# Patient Record
(demographics unavailable — no encounter records)

---

## 2025-04-25 NOTE — ASSESSMENT
[FreeTextEntry1] : 62 yo woman, former smoker (<<10 pk yrs) with bronchiectasis and NTM LD (dx ), s/p 1 yr of tripple-abx therapy in , who was previously followed by Dr. Plaza - presents for routine follow up  # Bronchiectasis >3 lobes Stable to mildly improved radiographic appearance as of 2024 sx: cough with mucus (< 1tbs per day), hemoptysis once (Summer 2024), no dyspnea, no fatigue BMI: 25 Etiology of bronchiectasis is unclear. May be secondary to childhood infections (including ). No h/o chronic rhinosinusitis, pancreatitis, GERD, CTD or IBD. Posible Asthma.  - continue with airway clearance maneuvers - refer to CF team for further evaluation of CFTR, PCD and related disorders - will refill levaquin for emergency use  # History of LINO infection.  -Another sputum culture obtained today  # Asthma -c/w Dulera   Follow-up 6 months: PFTs before next visit, may consider repeat CT scan at next visit

## 2025-04-25 NOTE — FAMILY HISTORY
[TextEntry] : no family h/o CF, bronchiectasis or any other lung disease Pt's uncle had TB but  3 yrs before pt was born

## 2025-04-25 NOTE — PHYSICAL EXAM
[No Acute Distress] : no acute distress [Normal Oropharynx] : normal oropharynx [Normal Rate/Rhythm] : normal rate/rhythm [Normal S1, S2] : normal s1, s2 [No Resp Distress] : no resp distress [TextBox_68] : gil YANES

## 2025-04-25 NOTE — SOCIAL HISTORY
[TextEntry] : From [facility]. In the [place]. 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

## 2025-04-25 NOTE — HISTORY OF PRESENT ILLNESS
[TextBox_4] : 60 yo woman, former smoker (<<10 pk yrs) with bronchiectasis and NTM LD (both dx 2014) who was previously followed by Dr. Plaza (who left the institution) who presents  for follow up  Had PNA soon after birth Frequent respiratory infections as a child PPD+ / has h/o BCG When she grew up, she "outgrew" this Immigrated from [place] in 1991 and did not have any serious resp infection until... In 2014 pt developed high fever and was dx with bilateral PNA Dx with BE Persistent mucus Followed with serial scans, then had bronch and bx Treated with triple abx for LINO in 2015 (12 months). Phlegm remained unchanged. No weight loss. No night sweats. No hemoptysis  Single episode of hemoptysis 2 yrs ago. Mild.  In Dec 2023 had a bad URI/bronchitis. In Jan 2024 had COVID. Had abx course  Sx at this time: Phlegm, not every day, usually clear to slight yellow using Aerobika Symbicort BID (seems to have helped by reducing cough when weather is bad) No hypertonic saline No Vest  no sinus dz hx or current sx no GERD no GI problems  4/19/24 Airway clearance: Acapella in AM 7% saline neb daily: a little sputum production most mucus in the early afternoon symbicort twice a day no fever walk upstairs 60 steps daily with minimal dyspnea no night sweats stable weight no hemoptysis tries to jog   10/4/24 Feeling well < 1 tbsp phlegm 1 episode of minimal hemoptysis since last visit Symbicort tiwce a day exercises 7% saline twice a day  4/25/25 Feeling better has been using huffing, jumping and percussion to increase sputum production no nebulizer or aerobika use walking for 30 minutes approx 1 tbsp of phlegm weight gain no hemopytsis recently

## 2025-04-25 NOTE — RESULTS/DATA
[TextEntry] : RADIOLOGY  CT CHEST   ORDERED BY: KAITLYNN GOLDSTEIN PROCEDURE DATE:  04/12/2024 IMPRESSION:  1. Continued evidence of moderate to severe small and large airways inflammation demonstrating a waxing and waning pattern and overall slightly improved from examination dated 11/3/2021 2. Additional findings as described above.  PFT 4/19/2024 FVC: 2.06 L, 61% predicted FEV1: 1.38 L, 53% predicted FEV1/FVC 67% CHANELLE: 10 ppb Impression moderate obstructive ventilatory deficit, possible concomitant restriction.  Not significantly different from 12/20/2022.  Normal exhaled nitric oxide  12/20/2022 Prebronchodilator: FVC 2.23 L or 65% predicted FEV1 1.47 L of 55% predicted FEV1/FVC 68% Postbronchodilator  FVC 2.39 L or 69% predicted (+7%) FEV1 1.65 L or 62% predicted (+12%) FEV1/FVC 69% Impression: Moderate obstructive ventilatory deficit with borderline bronchodilator responsiveness   EKG / ECHO      MICRO 3/13/2024 15:35 No AFB on concentrated smear, culture negative at 4 weeks Few Pseudomonas aeruginosa No fungus at 4 weeks  PATH    OTHER LABS OF NOTE 3/14/2024: Normal CBC with differential (1% eos), normal/negative alpha-1 antitrypsin, rheumatoid factor, C-reactive protein undetectable, vitamin D 27, normal IgG, IgA and IgM; normal immunoglobulin subtypes except mildly elevated IgG4 at 149 mg/dL (upper limit of normal 123), total IgE 101;

## 2025-04-25 NOTE — SOCIAL HISTORY
[TextEntry] : From [facility]. [place] the [place]. 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

## 2025-04-25 NOTE — IMMUNIZATIONS
[TextEntry] : Vaccination history: Last COVID Last flu: Fall 2023 RSV: none Last pneumococcal / type: series of 2 in 2016-17

## 2025-06-08 NOTE — HISTORY OF PRESENT ILLNESS
[Former] : former [Never] : never [TextBox_4] : Lucy is a 62-year-old female with a medical history of bronchiectasis of unknown etiology and NTM (mycobacterium avium-intracellulare) who presents to the CF clinic for a cystic fibrosis evaluation following a referral from MD Lancaster.   She was born in [facility] and denies being aware of any health complications at birth. There is no known family history of cystic fibrosis or pulmonary disease. She recalls being sick often particularly with multiple cases of pneumonia in early childhood, however, states from her teenage years until approximately 10 years ago was reportedly healthy. There is a distant history about 20 years ago of cigarette use during which time she would smoke one to two cigarettes in a day, but daily use was denied. Additionally, she is a mother of one child who was reportedly conceived without any difficulty, nor did she seek to have more children. 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  As previously mentioned, her pulmonary symptoms began approximately 10 years ago during which time she was diagnosed with an LINO infection. At that time, she experienced three days of fever and shortness of breath. She sought medical attention with her primary care provider who subsequently referred her to the ER where she was diagnosed with pneumonia and was admitted for one week. Upon discharge she was feeling better but not long after developed a productive cough with mucus that appeared "odd" in color. She identified with the LINO infection and recalls having a biopsy. During that time, she was referred to MD Maria Del Carmen Hu who treated with a triple therapy for LINO.  At this present time, she reports being without acute illness but states she is frequently ill. She coughs daily and denies noting a particular time during the day when the cough is worse. Her cough is associated with the daily presence of chest congestion which results in the need to clear; however, the secretion is reportedly difficult to mobilize. The secretion tends to vary in consistency but appears light yellow in color. The presence of wheezing, chest tightness, SOB and hemoptysis was denied. It was stated last summer there was an episode of hemoptysis which she suspects was a result of a coughing fit because she does not recall having been sick. To help mobilize secretions she is implementing a young cough technique which she finds to be most effective and exercise. She admits to having an Aerobika PEP device which is no longer in use since it was not found to be beneficial.   Furthermore, it was mentioned she has contracted COVID twice. Once on June 22, 2022, and then in January 2024 during which time she developed what she described as flu-like symptoms which included runny nose, fatigue, and malaise but no increase in pulmonary symptoms. Since having contracted COVID she has been experiencing chronic fatigue which is noted particularly with climbing multiple flights of stairs on her way to work which was previously done without issues. She reiterated that shortness of breath is not experienced as a result of exertion when climbing the stairs, yet she does experience difficulty once retirement through the approximate 60 stairs.   With regard to the gastrointestinal tract, she denies having any complaints. Good oral intake was reported. She denies experiencing difficulty gaining weight from childhood until present. The presence of abdominal pain, nausea, diarrhea, constipation, gassiness, bloating, steatorrhea and foul-smelling stools were denied. Bowel movements occur twice daily and are formed.

## 2025-06-08 NOTE — HISTORY OF PRESENT ILLNESS
[Former] : former [Never] : never [TextBox_4] : Lucy is a 62-year-old female with a medical history of bronchiectasis of unknown etiology and NTM (mycobacterium avium-intracellulare) who presents to the CF clinic for a cystic fibrosis evaluation following a referral from MD Lancaster.   She was born in [facility] and denies being aware of any health complications at birth. There is no known family history of cystic fibrosis or pulmonary disease. She recalls being sick often particularly with multiple cases of pneumonia in early childhood, however, states from her teenage years until approximately 10 years ago was reportedly healthy. There is a distant history about 20 years ago of cigarette use during which time she would smoke one to two cigarettes in a day, but daily use was denied. Additionally, she is a mother of one child who was reportedly conceived without any difficulty, nor did she seek to have more children. 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  As previously mentioned, her pulmonary symptoms began approximately 10 years ago during which time she was diagnosed with an LINO infection. At that time, she experienced three days of fever and shortness of breath. She sought medical attention with her primary care provider who subsequently referred her to the ER where she was diagnosed with pneumonia and was admitted for one week. Upon discharge she was feeling better but not long after developed a productive cough with mucus that appeared "odd" in color. She identified with the LINO infection and recalls having a biopsy. During that time, she was referred to MD Maria Del Carmen Hu who treated with a triple therapy for LINO.  At this present time, she reports being without acute illness but states she is frequently ill. She coughs daily and denies noting a particular time during the day when the cough is worse. Her cough is associated with the daily presence of chest congestion which results in the need to clear; however, the secretion is reportedly difficult to mobilize. The secretion tends to vary in consistency but appears light yellow in color. The presence of wheezing, chest tightness, SOB and hemoptysis was denied. It was stated last summer there was an episode of hemoptysis which she suspects was a result of a coughing fit because she does not recall having been sick. To help mobilize secretions she is implementing a young cough technique which she finds to be most effective and exercise. She admits to having an Aerobika PEP device which is no longer in use since it was not found to be beneficial.   Furthermore, it was mentioned she has contracted COVID twice. Once on June 22, 2022, and then in January 2024 during which time she developed what she described as flu-like symptoms which included runny nose, fatigue, and malaise but no increase in pulmonary symptoms. Since having contracted COVID she has been experiencing chronic fatigue which is noted particularly with climbing multiple flights of stairs on her way to work which was previously done without issues. She reiterated that shortness of breath is not experienced as a result of exertion when climbing the stairs, yet she does experience difficulty once [facility] through the approximate 60 stairs.   With regard to the gastrointestinal tract, she denies having any complaints. Good oral intake was reported. She denies experiencing difficulty gaining weight from childhood until present. The presence of abdominal pain, nausea, diarrhea, constipation, gassiness, bloating, steatorrhea and foul-smelling stools were denied. Bowel movements occur twice daily and are formed.

## 2025-06-08 NOTE — ASSESSMENT
[FreeTextEntry1] : # Screening for cystic fibrosis # Bronchiectasis -- Genetic counselor evaluation completed -- Provided rationale for cystic fibrosis clinic referral - Educated on purpose of sweat testing and described the process for completion -- Sweat test performed in-office during today's visit --Informed will receive a phone call regarding sweat test result once available -- Educated on what the sweat test lab values mean which is as follows:                   Less than or equal to 29 mmol/L = CF is unlikely                   30 - 59 mmol/L = CF is possible, and additional testing is needed (intermediate result)                   Greater than or equal to 60 mmol/L = CF is likely. --- [facility] genetic testing completed using a buccal swab collection kit to rule out the diagnosis of cystic fibrosis and PCD which should be finalized in approximately 3 weeks; Informed consent obtained -- Recommend scheduling a follow-up visit to educate on various airway clearance modalities in addition to review present technique for Aerobika implementation

## 2025-06-08 NOTE — REASON FOR VISIT
[Consultation] : a consultation [TextBox_44] : Cystic fibrosis evaluation [TextBox_13] : MD Trevor Lancaster [TextEntry] : Visit completed in collaboration with Maria A Mendosa, MONSERRATP-BC

## 2025-06-08 NOTE — HISTORY OF PRESENT ILLNESS
[Former] : former [Never] : never [TextBox_4] : Lucy is a 62-year-old female with a medical history of bronchiectasis of unknown etiology and NTM (mycobacterium avium-intracellulare) who presents to the CF clinic for a cystic fibrosis evaluation following a referral from MD Lancaster.   She was born in [facility] and denies being aware of any health complications at birth. There is no known family history of cystic fibrosis or pulmonary disease. She recalls being sick often particularly with multiple cases of pneumonia in early childhood, however, states from her teenage years until approximately 10 years ago was reportedly healthy. There is a distant history about 20 years ago of cigarette use during which time she would smoke one to two cigarettes in a day, but daily use was denied. Additionally, she is a mother of one child who was reportedly conceived without any difficulty, nor did she seek to have more children. 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  As previously mentioned, her pulmonary symptoms began approximately 10 years ago during which time she was diagnosed with an LINO infection. At that time, she experienced three days of fever and shortness of breath. She sought medical attention with her primary care provider who subsequently referred her to the ER where she was diagnosed with pneumonia and was admitted for one week. Upon discharge she was feeling better but not long after developed a productive cough with mucus that appeared "odd" in color. She identified with the LINO infection and recalls having a biopsy. During that time, she was referred to MD Maria Del Carmen Hu who treated with a triple therapy for LINO.  At this present time, she reports being without acute illness but states she is frequently ill. She coughs daily and denies noting a particular time during the day when the cough is worse. Her cough is associated with the daily presence of chest congestion which results in the need to clear; however, the secretion is reportedly difficult to mobilize. The secretion tends to vary in consistency but appears light yellow in color. The presence of wheezing, chest tightness, SOB and hemoptysis was denied. It was stated last summer there was an episode of hemoptysis which she suspects was a result of a coughing fit because she does not recall having been sick. To help mobilize secretions she is implementing a young cough technique which she finds to be most effective and exercise. She admits to having an Aerobika PEP device which is no longer in use since it was not found to be beneficial.   Furthermore, it was mentioned she has contracted COVID twice. Once on June 22, 2022, and then in January 2024 during which time she developed what she described as flu-like symptoms which included runny nose, fatigue, and malaise but no increase in pulmonary symptoms. Since having contracted COVID she has been experiencing chronic fatigue which is noted particularly with climbing multiple flights of stairs on her way to work which was previously done without issues. She reiterated that shortness of breath is not experienced as a result of exertion when climbing the stairs, yet she does experience difficulty once [facility] through the approximate 60 stairs.   With regard to the gastrointestinal tract, she denies having any complaints. Good oral intake was reported. She denies experiencing difficulty gaining weight from childhood until present. The presence of abdominal pain, nausea, diarrhea, constipation, gassiness, bloating, steatorrhea and foul-smelling stools were denied. Bowel movements occur twice daily and are formed.

## 2025-06-08 NOTE — PHYSICAL EXAM
[No Acute Distress] : no acute distress [Well Nourished] : well nourished [Well Groomed] : well groomed [Well Developed] : well developed [Normal Oropharynx] : normal oropharynx [Normal Appearance] : normal appearance [No Neck Mass] : no neck mass [Normal Rate/Rhythm] : normal rate/rhythm [Normal S1, S2] : normal s1, s2 [No Resp Distress] : no resp distress [No Acc Muscle Use] : no acc muscle use [Normal Rhythm and Effort] : normal rhythm and effort [Normal Gait] : normal gait [No Clubbing] : no clubbing [No Cyanosis] : no cyanosis [Normal Color/ Pigmentation] : normal color/ pigmentation [No Focal Deficits] : no focal deficits [Oriented x3] : oriented x3 [Normal Affect] : normal affect [TextBox_68] : scattered crackles > left lower lobe.

## 2025-06-08 NOTE — END OF VISIT
[Time Spent: ___ minutes] : I have spent [unfilled] minutes of time on the encounter which excludes teaching and separately reported services. [FreeTextEntry3] : I, Dr. Pat Madden, personally performed the evaluation and management (E/M) services for this new patient who was referred by my colleague Dr. Lancaster to rule out CF in the setting of Bronchiectasis and other gastrointestinal issues. The E/M includes conducting the visit, assessing all reported conditions, and establishing a diagnostic strategy. Today, my NP, Maria A Mendosa participated in the visit. I agree with the detailed visit note and the assessment and plan. Our genetic counselor, Maricarmen Salgado, conducted a detailed visit and assessment. See separate note for impression and recommendations for further evaluation.

## 2025-07-29 NOTE — REVIEW OF SYSTEMS
[Negative] : Psychiatric [TextBox_30] : see HPI [TextBox_122] : + intermittent episodes of migraines

## 2025-07-29 NOTE — PHYSICAL EXAM
[No Acute Distress] : no acute distress [Well Nourished] : well nourished [Well Groomed] : well groomed [Well Developed] : well developed [Normal Oropharynx] : normal oropharynx [Normal Appearance] : normal appearance [No Neck Mass] : no neck mass [Normal Rate/Rhythm] : normal rate/rhythm [Normal S1, S2] : normal s1, s2 [No Resp Distress] : no resp distress [No Acc Muscle Use] : no acc muscle use [Normal Rhythm and Effort] : normal rhythm and effort [Normal Gait] : normal gait [No Clubbing] : no clubbing [No Cyanosis] : no cyanosis [Normal Color/ Pigmentation] : normal color/ pigmentation [No Focal Deficits] : no focal deficits [Oriented x3] : oriented x3 [Normal Affect] : normal affect [TextBox_68] : scattered crackles > left upper lobe.

## 2025-07-29 NOTE — HISTORY OF PRESENT ILLNESS
[TextBox_4] : Lucy is a 62-year-old female with a medical history of bronchiectasis of unknown etiology and NTM (mycobacterium avium-intracellulare) who presents to the CF clinic for a follow-up visit and airway clearance therapy education. She was initially seen on May 29, 2025, for a cystic fibrosis evaluation following a referral from MD Lancaster. Genetic testing through [facility] as well as a sweat test were completed. Normal sweat test values of 22 mmol/L and 14 mmol/L were yield along with a negative genetic result which was not consistent for either a diagnosis of cystic fibrosis or primary ciliary dyskinesia.   She relays being in good health and denies any recent history of acute illness. She denies having any sinopulmonary complaints such as postnasal drip, wheezing, SOB, or chest tightness. A mild form of exertional dyspnea is noted with climbing multiple flights of stairs; however, she reportedly is able to complete the task without needing to stop but will slow her pace as needed. It was mentioned due to a lack of insurance coverage she is no longer taking the LABA Dulera and it has been approximately three to four weeks. Despite the lack of Dulera administration she is reportedly stable. According to Lucy, her symptoms for which the Dulera had been prescribed initially is not present during the warm months but is present during the winter. In the winter she develops an irritation in the upper airway that results in the need to cough which then progresses into a coughing fit.   Currently, she notes the presence of a daily cough which tends to be associated to feeling the presence of something in the upper airway which she needs to mobilize. She denies noting a particular time during the day when the cough is worse. The cough is not persistent and described as lasting a short interval of time once the secretion is mobilized. As per Lucy, it feels like she need to throat clear. As a form of ACT she implements the young cough technique and jogs in place which she tends to do in the evening which she finds to be quite effective. The mucus expectorated is reportedly light yellow in color and varies in consistency but is of a scant quantity.

## 2025-07-29 NOTE — END OF VISIT
[Time Spent: ___ minutes] : I have spent [unfilled] minutes of time on the encounter which excludes teaching and separately reported services. [FreeTextEntry3] : I, Dr. Pat Madden, personally performed the evaluation and management (E/M) services for this established patient who presents today for [facility] Bronchiectasis follow-up, seen in collaboration with my colleague Dr. Jose Lancaster. That E/M includes conducting the examination myself, assessing all new/exacerbated conditions, and establishing a new plan of care. Today, our NP, Maria A Mendosa, was present for the entire visit to observe and participate in the evaluation and management services for this condition. The patient was also seen in collaboration with our PT, Williams Silva for ACT review and recommendations. A multidisciplinary discussion was conducted to review their input and recommendations. I agree with the detailed assessment and plan.

## 2025-07-29 NOTE — REASON FOR VISIT
[Bronchiectasis] : bronchiectasis [Follow-Up] : a follow-up visit [TextBox_13] : MD Trevor Lancaster [TextEntry] : Visit completed in collaboration with Maria A Mendosa, MONSERRATP-BC

## 2025-07-29 NOTE — END OF VISIT
[Time Spent: ___ minutes] : I have spent [unfilled] minutes of time on the encounter which excludes teaching and separately reported services. [FreeTextEntry3] : I, Dr. Pat Madden, personally performed the evaluation and management (E/M) services for this established patient who presents today for [facility] Bronchiectasis follow-up, seen in collaboration with my colleague Dr. Jose Lancaster. That E/M includes conducting the examination myself, assessing all new/exacerbated conditions, and establishing a new plan of care. Today, our NP, Maria A Mendosa, was present for the entire visit to observe and participate in the evaluation and management services for this condition. The patient was also seen in collaboration with our PT, Williams Silva for ACT review and recommendations. A multidisciplinary discussion was conducted to review their input and recommendations. I agree with the detailed assessment and plan.

## 2025-07-29 NOTE — ASSESSMENT
[FreeTextEntry1] : Lucy is a 62-year-old female with bronchiectasis who presents for a follow-up and airway clearance therapy (ACT) training. Initially seen for a cystic fibrosis evaluation. Diagnosis of cystic fibrosis and PCD ruled out following completion of a sweat test and [facility] genetic testing. Clinically stable at this time. Reports baseline pulmonary symptoms. Educated on ACT and importance of implementation for management of pulmonary health.    # Bronchiectasis -- Airway clearance therapy education which included training on ACBT and acapella performed by LANG Silva  -- Recommend implementing ACT daily using the Acapella  -- Respiratory cultures obtained -- Informed patient will collaborate with the referring physician MD Lancaster whether the chronic cycling antibiotic Tobramycin should be initiated due to known history of pseudomonas aeruginosa colonization -- Sweat test completed on May 29, 2025, resulted 22 mmol/L and 14 mmol/L  -- [facility] Genetic testing completed on May 29, 2025. Findings for CF and PCD were negative -- Will notify MD Lancaster patient not currently on Dulera related to lack of insurance coverage